# Patient Record
Sex: MALE | Race: WHITE | URBAN - METROPOLITAN AREA
[De-identification: names, ages, dates, MRNs, and addresses within clinical notes are randomized per-mention and may not be internally consistent; named-entity substitution may affect disease eponyms.]

---

## 2021-03-04 ENCOUNTER — TRANSFERRED RECORDS (OUTPATIENT)
Dept: HEALTH INFORMATION MANAGEMENT | Facility: CLINIC | Age: 51
End: 2021-03-04

## 2021-03-11 ENCOUNTER — NURSING HOME VISIT (OUTPATIENT)
Dept: GERIATRICS | Facility: CLINIC | Age: 51
End: 2021-03-11
Payer: COMMERCIAL

## 2021-03-11 VITALS
SYSTOLIC BLOOD PRESSURE: 138 MMHG | HEART RATE: 68 BPM | RESPIRATION RATE: 16 BRPM | DIASTOLIC BLOOD PRESSURE: 82 MMHG | TEMPERATURE: 98.6 F | OXYGEN SATURATION: 98 %

## 2021-03-11 DIAGNOSIS — I10 ESSENTIAL HYPERTENSION: ICD-10-CM

## 2021-03-11 DIAGNOSIS — I87.2 VENOUS STASIS DERMATITIS OF BOTH LOWER EXTREMITIES: ICD-10-CM

## 2021-03-11 DIAGNOSIS — I50.31 ACUTE DIASTOLIC HEART FAILURE (H): Primary | ICD-10-CM

## 2021-03-11 DIAGNOSIS — F10.10 ALCOHOL ABUSE: ICD-10-CM

## 2021-03-11 DIAGNOSIS — R53.81 PHYSICAL DECONDITIONING: ICD-10-CM

## 2021-03-11 PROBLEM — R60.0 BILATERAL LEG EDEMA: Status: ACTIVE | Noted: 2021-03-04

## 2021-03-11 PROBLEM — R80.9 PROTEINURIA: Status: ACTIVE | Noted: 2021-03-05

## 2021-03-11 PROBLEM — F32.A DEPRESSION: Status: ACTIVE | Noted: 2021-03-04

## 2021-03-11 PROCEDURE — 99309 SBSQ NF CARE MODERATE MDM 30: CPT | Performed by: NURSE PRACTITIONER

## 2021-03-11 RX ORDER — POLYETHYLENE GLYCOL 3350 17 G/17G
17 POWDER, FOR SOLUTION ORAL DAILY
COMMUNITY
Start: 2021-03-10

## 2021-03-11 RX ORDER — FUROSEMIDE 40 MG
40 TABLET ORAL DAILY
COMMUNITY
Start: 2021-03-10

## 2021-03-11 RX ORDER — POTASSIUM CHLORIDE 1500 MG/1
20 TABLET, EXTENDED RELEASE ORAL DAILY
COMMUNITY
Start: 2021-03-10

## 2021-03-11 RX ORDER — LISINOPRIL 20 MG/1
20 TABLET ORAL 2 TIMES DAILY
COMMUNITY
Start: 2021-03-10

## 2021-03-11 RX ORDER — LANOLIN ALCOHOL/MO/W.PET/CERES
6 CREAM (GRAM) TOPICAL
COMMUNITY
Start: 2021-03-10

## 2021-03-11 RX ORDER — CARVEDILOL 25 MG/1
25 TABLET ORAL 2 TIMES DAILY
COMMUNITY
Start: 2021-03-10

## 2021-03-11 RX ORDER — CEPHALEXIN 500 MG/1
1000 CAPSULE ORAL 3 TIMES DAILY
COMMUNITY
Start: 2021-03-10 | End: 2021-03-13

## 2021-03-11 RX ORDER — ACETAMINOPHEN 325 MG/1
650 TABLET ORAL 4 TIMES DAILY PRN
COMMUNITY
Start: 2021-03-10

## 2021-03-11 RX ORDER — FOLIC ACID 1 MG/1
1 TABLET ORAL DAILY
COMMUNITY
Start: 2021-03-11

## 2021-03-11 RX ORDER — OXYCODONE HYDROCHLORIDE 5 MG/1
5 TABLET ORAL EVERY 4 HOURS PRN
COMMUNITY
Start: 2021-03-10

## 2021-03-11 RX ORDER — MULTIVITAMIN
1 TABLET ORAL DAILY
COMMUNITY
Start: 2021-03-10

## 2021-03-11 NOTE — LETTER
3/11/2021        RE: Anjel Mullins  [address] [place] [id number]        [facility]  PRIMARY CARE PROVIDER AND CLINIC:  No primary care provider on file., No primary physician on file.  Chief Complaint   Patient presents with     [facility] F/U     [place] Medical Record Number:  [id number]  Place of Service where encounter took place:  [facility] (TCU) [[id number]]    Anjel Mullins  is a 50 year old  (1970), admitted to the above facility from  [facility] . Hospital stay 3/4/21 through 3/10/21..  Admitted to this facility for  rehab, medical management and nursing care.    HPI:    HPI information obtained from: facility chart records, facility staff, patient report and Care Everywhere Epic chart review.   Brief Summary of Hospital Course: PMH alcoholism and untreated HTN. He presented to the ED with progressive BLE edema and pain. He has had longstanding edema that worsened over a week and he noted CRUZ. He developed several small open areas on legs that were weeping. He also had severely high BP. Echo consistent with diastolic CHF. He was diuresed with lasix IV infusion. Carvedilol and lisinopril started for HTN. He did suffer from some mild alcohol withdrawal. He plans to go to chem dep treatment after his TCU stay.     Updates on Status Since Skilled nursing Admission:   Patient reports that he is already getting some strength back. In the hospital he couldn't even take two steps and today he was able to walk around his room with the walker. Legs are currently wrapped. No SOB, abdominal discomfort. -150s/80s    CODE STATUS/ADVANCE DIRECTIVES DISCUSSION:   CPR/Full code   Patient's living condition: lives with spouse  ALLERGIES: Patient has no known allergies.  PAST MEDICAL HISTORY:  has a past medical history of HTN.  PAST SURGICAL HISTORY:   has a past surgical history that includes surgical history of -   (2002).  FAMILY HISTORY: family history includes Diabetes in his mother.  SOCIAL HISTORY:   reports that he has never smoked. He does not have any smokeless tobacco history on file.    Post Discharge Medication Reconciliation Status: discharge medications reconciled, continue medications without change    Current Outpatient Medications   Medication Sig Dispense Refill     acetaminophen (TYLENOL) 325 MG tablet Take 650 mg by mouth 4 times daily as needed       carvedilol (COREG) 25 MG tablet Take 25 mg by mouth 2 times daily       cephALEXin (KEFLEX) 500 MG capsule Take 1,000 mg by mouth 3 times daily       folic acid (FOLVITE) 1 MG tablet Take 1 mg by mouth daily       furosemide (LASIX) 40 MG tablet Take 40 mg by mouth daily       lisinopril (ZESTRIL) 20 MG tablet Take 20 mg by mouth 2 times daily       melatonin 3 MG tablet Take 6 mg by mouth nightly as needed       oxyCODONE (ROXICODONE) 5 MG tablet Take 5 mg by mouth every 4 hours as needed       polyethylene glycol (MIRALAX) 17 GM/Dose powder 17 g by Nasogastric route daily       potassium chloride ER (KLOR-CON M) 20 MEQ CR tablet Take 20 mEq by mouth daily       Specialty Vitamins Products (VITAMINS FOR HAIR) TABS Take 1 tablet by mouth daily       thiamine 50 MG TABS Take 50 mg by mouth daily         ROS:  10 point ROS of systems including Constitutional, Eyes, Respiratory, Cardiovascular, Gastroenterology, Genitourinary, Integumentary, Musculoskeletal, Psychiatric were all negative except for pertinent positives noted in my HPI.    Vitals:  /82   Pulse 68   Temp 98.6  F (37  C)   Resp 16   SpO2 98%   Exam:  GENERAL APPEARANCE:  Alert, in no distress, oriented  ENT:  Mouth and posterior oropharynx normal, moist mucous membranes, normal hearing acuity  EYES:  EOM, conjunctivae, lids, pupils and irises normal  RESP:  respiratory effort and palpation of chest normal, lungs clear to auscultation , no respiratory distress  CV:  Palpation and  auscultation of heart done , regular rate and rhythm, no murmur, rub, or gallop, peripheral edema 3+ in BLE  ABDOMEN:  normal bowel sounds, soft, nontender, no hepatosplenomegaly or other masses  SKIN:  Inspection of skin and subcutaneous tissue baseline, Palpation of skin and subcutaneous tissue baseline, (did not view legs due to lymphedema wraps)  PSYCH:  oriented X 3, affect and mood normal    Lab/Diagnostic data:  Recent labs in [facility] reviewed by me today.       ASSESSMENT/PLAN:  (I50.31) Acute diastolic heart failure (H)  (primary encounter diagnosis)  Comment: Ongoing edema, but no evidence of pulmonary or abdominal fluid.   Plan: Continue current medications: lasix. BMP 3/15. Monitor weights q day. Call provider if greater than 5 pound gain from previous weight. Monitor for shortness of breath, wheezing, increasing lower extremity edema and change in activity tolerance.     (I10) Essential hypertension  Comment: Fair control. BB and ACEI are already at max doses. If BP were to consistently be >140/90, would need to add another agent, likely CCB. Monitor for now  Plan: Continue current POC with no changes at this time and adjustments as needed.    (R53.81) Physical deconditioning  Comment: Due to acute illness  Plan: PT/OT eval and treat, discharge planning per their recommendations.    (I87.2) Venous stasis dermatitis of both lower extremities  Comment: Chronic, but will likely improve with lymphedema therapy, good nursing care  Plan: Continue current POC with no changes at this time and adjustments as needed.    (F10.10) Alcohol abuse  Comment: Chronic  Plan: Social work to assist as needed with treatment plan      Electronically signed by:  PRATIBHA Echols [facility]  Phone: [phone]

## 2021-03-11 NOTE — PROGRESS NOTES
[facility]  PRIMARY CARE PROVIDER AND CLINIC:  No primary care provider on file., No primary physician on file.  Chief Complaint   Patient presents with     [facility] F/U     [place] Medical Record Number:  [id number]  Place of Service where encounter took place:  [facility] (U) [[id number]]    Anjel Mullins  is a 50 year old  (1970), admitted to the above facility from  [facility] . Hospital stay 3/4/21 through 3/10/21..  Admitted to this facility for  rehab, medical management and nursing care.    HPI:    HPI information obtained from: facility chart records, facility staff, patient report and Care Everywhere Epic chart review.   Brief Summary of Hospital Course: PMH alcoholism and untreated HTN. He presented to the ED with progressive BLE edema and pain. He has had longstanding edema that worsened over a week and he noted CRUZ. He developed several small open areas on legs that were weeping. He also had severely high BP. Echo consistent with diastolic CHF. He was diuresed with lasix IV infusion. Carvedilol and lisinopril started for HTN. He did suffer from some mild alcohol withdrawal. He plans to go to chem dep treatment after his TCU stay.     Updates on Status Since Skilled nursing Admission:   Patient reports that he is already getting some strength back. In the hospital he couldn't even take two steps and today he was able to walk around his room with the walker. Legs are currently wrapped. No SOB, abdominal discomfort. -150s/80s    CODE STATUS/ADVANCE DIRECTIVES DISCUSSION:   CPR/Full code   Patient's living condition: lives with spouse  ALLERGIES: Patient has no known allergies.  PAST MEDICAL HISTORY:  has a past medical history of HTN.  PAST SURGICAL HISTORY:   has a past surgical history that includes surgical history of -  (2002).  FAMILY HISTORY: family history includes Diabetes in his mother.  SOCIAL HISTORY:   reports that  he has never smoked. He does not have any smokeless tobacco history on file.    Post Discharge Medication Reconciliation Status: discharge medications reconciled, continue medications without change    Current Outpatient Medications   Medication Sig Dispense Refill     acetaminophen (TYLENOL) 325 MG tablet Take 650 mg by mouth 4 times daily as needed       carvedilol (COREG) 25 MG tablet Take 25 mg by mouth 2 times daily       cephALEXin (KEFLEX) 500 MG capsule Take 1,000 mg by mouth 3 times daily       folic acid (FOLVITE) 1 MG tablet Take 1 mg by mouth daily       furosemide (LASIX) 40 MG tablet Take 40 mg by mouth daily       lisinopril (ZESTRIL) 20 MG tablet Take 20 mg by mouth 2 times daily       melatonin 3 MG tablet Take 6 mg by mouth nightly as needed       oxyCODONE (ROXICODONE) 5 MG tablet Take 5 mg by mouth every 4 hours as needed       polyethylene glycol (MIRALAX) 17 GM/Dose powder 17 g by Nasogastric route daily       potassium chloride ER (KLOR-CON M) 20 MEQ CR tablet Take 20 mEq by mouth daily       Specialty Vitamins Products (VITAMINS FOR HAIR) TABS Take 1 tablet by mouth daily       thiamine 50 MG TABS Take 50 mg by mouth daily         ROS:  10 point ROS of systems including Constitutional, Eyes, Respiratory, Cardiovascular, Gastroenterology, Genitourinary, Integumentary, Musculoskeletal, Psychiatric were all negative except for pertinent positives noted in my HPI.    Vitals:  /82   Pulse 68   Temp 98.6  F (37  C)   Resp 16   SpO2 98%   Exam:  GENERAL APPEARANCE:  Alert, in no distress, oriented  ENT:  Mouth and posterior oropharynx normal, moist mucous membranes, normal hearing acuity  EYES:  EOM, conjunctivae, lids, pupils and irises normal  RESP:  respiratory effort and palpation of chest normal, lungs clear to auscultation , no respiratory distress  CV:  Palpation and auscultation of heart done , regular rate and rhythm, no murmur, rub, or gallop, peripheral edema 3+ in BLE  ABDOMEN:   normal bowel sounds, soft, nontender, no hepatosplenomegaly or other masses  SKIN:  Inspection of skin and subcutaneous tissue baseline, Palpation of skin and subcutaneous tissue baseline, (did not view legs due to lymphedema wraps)  PSYCH:  oriented X 3, affect and mood normal    Lab/Diagnostic data:  Recent labs in [facility] reviewed by me today.       ASSESSMENT/PLAN:  (I50.31) Acute diastolic heart failure (H)  (primary encounter diagnosis)  Comment: Ongoing edema, but no evidence of pulmonary or abdominal fluid.   Plan: Continue current medications: lasix. BMP 3/15. Monitor weights q day. Call provider if greater than 5 pound gain from previous weight. Monitor for shortness of breath, wheezing, increasing lower extremity edema and change in activity tolerance.     (I10) Essential hypertension  Comment: Fair control. BB and ACEI are already at max doses. If BP were to consistently be >140/90, would need to add another agent, likely CCB. Monitor for now  Plan: Continue current POC with no changes at this time and adjustments as needed.    (R53.81) Physical deconditioning  Comment: Due to acute illness  Plan: PT/OT eval and treat, discharge planning per their recommendations.    (I87.2) Venous stasis dermatitis of both lower extremities  Comment: Chronic, but will likely improve with lymphedema therapy, good nursing care  Plan: Continue current POC with no changes at this time and adjustments as needed.    (F10.10) Alcohol abuse  Comment: Chronic  Plan: Social work to assist as needed with treatment plan      Electronically signed by:  PRATIBHA Echols CNP   [facility]  Phone: [phone]

## 2021-03-15 ENCOUNTER — TELEPHONE (OUTPATIENT)
Dept: GERIATRICS | Facility: CLINIC | Age: 51
End: 2021-03-15

## 2021-03-15 NOTE — TELEPHONE ENCOUNTER
[facility] Nurse Triage Telephone Note    Provider: Margarita Bobo NP  Facility: [facility]     Facility Type:  TCU    Caller: Leila  Call Back Number: [phone]    Allergies:  No Known Allergies     Reason for call: Elevated BP's especially am's.  This am before meds 180/110. After Carvedilol 25mg, Lisinipril 20mg,  Lasix 40mg BP still 177/105 P:74. Has lymphedema wraps on & wt down supposedly to 233#. (Has been running 242-243#). Pt denies any complaints, lungs clear. Of note meds are given 8am & 1700.    Verbal Order/Direction given by Provider: Change the BID Carvedilol & Lisinipril to be given 8am & 8PM to get better around the clock BP coverage.  If SBP tomorrow after am meds is still >170 call again to discuss maybe adding CCB.    Provider giving Order:  Sierra Wilkerson NP    Verbal Order given to: Luis Rogers RN

## 2021-03-15 NOTE — PROGRESS NOTES
Comment: Triage Registered Nurse called to update staff concerns regarding elevated BP. This AM prior to medications his SBP>180. Re-assessment of BP remains >170. No additional clinical s/sx of CHF noted. Lungs clear. All other vitals stable. No complaints verbalized to staff of other concerns. Current medications are given at 8am and 5pm.   Plan:  -Instructed staff to change time administration of coreg and lisinopril to 8am and 8pm.  -If SBP>170 tomorrow AM 3/16/21-would recommend starting amlodipine 5mg daily.    -Continue to monitor BP and HR as directed at facility

## 2021-03-16 ENCOUNTER — TELEPHONE (OUTPATIENT)
Dept: GERIATRICS | Facility: CLINIC | Age: 51
End: 2021-03-16

## 2021-03-16 NOTE — TELEPHONE ENCOUNTER
Resident having higher blood pressures during the day and now around 12 midnight his pressure is 193/123    Received his B/P medications already.    Orders:  From the E kit get Hydralazine 10mg po now.  Call back in one hour if blood pressure above the reported b/p above.  If lower than let him rest    Electronically signed by Tamara Torres RN, CNP

## 2021-03-17 NOTE — TELEPHONE ENCOUNTER
1750:  Patients blood pressure 196/120 - did receive one time dose of hydralazine overnight for elevated blood pressure as well.      ORDERS   -Give hydralazine 10 mg from the kit now  -recheck blood pressure in one hour, contact provider of systolic blood pressure greater than 170  -once patient is stable, start hydralazine 25 mg PO be ID PRN for systolic blood pressure greater than 170; notify primary nurse practitioner if this is used  ----------------  1900:  Patient's BP now 210/118 following Hydralazine 10mg dose     ORDERS  -Give Hydralazine 20mg NOW  -recheck blood pressure in one hour, contact provider of systolic blood pressure greater than 170  --------------------------  2115:  Patient's BP 45min ago 219/120 manually; done again 191/112 on the machine - he had just received his Carvedilol and Lisinopril. Nurse rechecked BP manually while writer on the phone attaining a BP of 196/124.    ORDERS  -Clonidine 0.1mg NOW  -Hydralazine 50mg NOW  -recheck blood pressure in one hour, contact provider of systolic blood pressure greater than 170    ----------------------------  2300:  Patient's /118 manually    ORDERS  -Clonidine 0.1mg NOW  -recheck blood pressure in one hour, contact provider of systolic blood pressure greater than 170    -------------------------------  0430:  No return call - if Clonidine proved more effective than Hydralazine recommend scheduling this 2/2 excessive resistance to gain control       Dr. Jessica Monge, APRN, BETTIE  [place] [facility]  [place] Office: [address] [place] [address]   [place] Cell: [phone]  [place] Fax: [phone]     [facility] Offce: [address] [place] [address]  [facility] Phone: [phone]  [employer] Voicemail: [phone]     Email: [email or web address]

## 2021-03-18 ENCOUNTER — NURSING HOME VISIT (OUTPATIENT)
Dept: GERIATRICS | Facility: CLINIC | Age: 51
End: 2021-03-18
Payer: COMMERCIAL

## 2021-03-18 VITALS
BODY MASS INDEX: 29.42 KG/M2 | HEART RATE: 77 BPM | HEIGHT: 73 IN | OXYGEN SATURATION: 98 % | DIASTOLIC BLOOD PRESSURE: 86 MMHG | RESPIRATION RATE: 20 BRPM | TEMPERATURE: 98.5 F | WEIGHT: 222 LBS | SYSTOLIC BLOOD PRESSURE: 155 MMHG

## 2021-03-18 DIAGNOSIS — I87.2 VENOUS STASIS DERMATITIS OF BOTH LOWER EXTREMITIES: ICD-10-CM

## 2021-03-18 DIAGNOSIS — R53.81 PHYSICAL DECONDITIONING: ICD-10-CM

## 2021-03-18 DIAGNOSIS — I50.31 ACUTE DIASTOLIC HEART FAILURE (H): Primary | ICD-10-CM

## 2021-03-18 DIAGNOSIS — F10.10 ALCOHOL ABUSE: ICD-10-CM

## 2021-03-18 DIAGNOSIS — I10 ESSENTIAL HYPERTENSION: ICD-10-CM

## 2021-03-18 PROCEDURE — 99309 SBSQ NF CARE MODERATE MDM 30: CPT | Performed by: NURSE PRACTITIONER

## 2021-03-18 RX ORDER — HYDRALAZINE HYDROCHLORIDE 25 MG/1
25 TABLET, FILM COATED ORAL 2 TIMES DAILY PRN
Start: 2021-03-18

## 2021-03-18 RX ORDER — AMLODIPINE BESYLATE 10 MG/1
10 TABLET ORAL DAILY
Start: 2021-03-18

## 2021-03-18 ASSESSMENT — MIFFLIN-ST. JEOR: SCORE: 1920.87

## 2021-03-18 NOTE — LETTER
3/18/2021        RE: Anjel Mullins  [address] [place] [id number]        [facility]  [place] Medical Record Number:  [id number]  Place of Service where encounter took place:  [facility] [[id number]]  Chief Complaint   Patient presents with     RECHECK       HPI:    Anjel Mullins  is a 50 year old (1970), who is being seen today for an episodic care visit. [facility] stay 3/4/21 through 3/10/21. Admitted to this facility for  rehab, medical management and nursing care. PMH alcoholism and untreated HTN. He presented to the ED with progressive BLE edema and pain. He has had longstanding edema that worsened over a week and he noted CRUZ. He developed several small open areas on legs that were weeping. He also had severely high BP. Echo consistent with diastolic CHF. He was diuresed with lasix IV infusion. Carvedilol and lisinopril started for HTN. He did suffer from some mild alcohol withdrawal. He plans to go to [facility] treatment after his TCU stay.       HPI information obtained from: facility chart records, facility staff and patient report.     Today's concern is:  Acute diastolic heart failure (H)  Weight and edema are reduced significantly. Lymphedema therapy has been wrapping his legs. No SOB, hypoxia  Wt Readings from Last 4 Encounters:   03/18/21 100.7 kg (222 lb)   02/16/09 112.9 kg (249 lb)     Essential hypertension  -200s/90-120s. He denies having any anxiety or pain when his BP is more elevated.    Physical deconditioning  Working with therapy, improving    Venous stasis dermatitis of both lower extremities  Wound doctor saw him yesterday and debrided several areas of thick skin. Nursing reports that the tissue below these was healthy looking        Past Medical and Surgical History reviewed in Epic today.    MEDICATIONS:    Current Outpatient Medications   Medication Sig Dispense Refill     acetaminophen  "(TYLENOL) 325 MG tablet Take 650 mg by mouth 4 times daily as needed       carvedilol (COREG) 25 MG tablet Take 25 mg by mouth 2 times daily       folic acid (FOLVITE) 1 MG tablet Take 1 mg by mouth daily       furosemide (LASIX) 40 MG tablet Take 40 mg by mouth daily       hydrALAZINE (APRESOLINE) 25 MG tablet Take 1 tablet (25 mg) by mouth 2 times daily as needed (SBP > 170)       lisinopril (ZESTRIL) 20 MG tablet Take 20 mg by mouth 2 times daily       melatonin 3 MG tablet Take 6 mg by mouth nightly as needed       oxyCODONE (ROXICODONE) 5 MG tablet Take 5 mg by mouth every 4 hours as needed       polyethylene glycol (MIRALAX) 17 GM/Dose powder 17 g by Nasogastric route daily       potassium chloride ER (KLOR-CON M) 20 MEQ CR tablet Take 20 mEq by mouth daily       Specialty Vitamins Products (VITAMINS FOR HAIR) TABS Take 1 tablet by mouth daily       thiamine 50 MG TABS Take 50 mg by mouth daily           REVIEW OF SYSTEMS:  10 point ROS of systems including Constitutional, Eyes, Respiratory, Cardiovascular, Gastroenterology, Genitourinary, Integumentary, Musculoskeletal, Psychiatric were all negative except for pertinent positives noted in my HPI.    Objective:  BP (!) 155/86   Pulse 77   Temp 98.5  F (36.9  C)   Resp 20   Ht 1.854 m (6' 1\")   Wt 100.7 kg (222 lb)   SpO2 98%   BMI 29.29 kg/m    Exam:  GENERAL APPEARANCE:  Alert, in no distress, oriented  ENT:  Mouth and posterior oropharynx normal, moist mucous membranes, normal hearing acuity  EYES:  EOM, conjunctivae, lids, pupils and irises normal  RESP:  respiratory effort and palpation of chest normal, lungs clear to auscultation , no respiratory distress  CV:  Palpation and auscultation of heart done , regular rate and rhythm, no murmur, rub, or gallop, legs wrapped, pedal edema appears to be 3+, but lower legs now 1-2+  ABDOMEN:  normal bowel sounds, soft, nontender, no hepatosplenomegaly or other masses  SKIN:  Inspection of skin and " subcutaneous tissue baseline, Palpation of skin and subcutaneous tissue baseline, (did not view legs due to lymphedema wraps)  PSYCH:  oriented X 3, affect and mood normal      Labs:   Recent labs in [facility] reviewed by me today.       ASSESSMENT/PLAN:  (I50.31) Acute diastolic heart failure (H)  (primary encounter diagnosis)  Comment: Improving with diuretic and compression.   Plan: Continue current POC with no changes at this time and adjustments as needed.    (I10) Essential hypertension  Comment: Poorly controlled. BB and ACEI are already at max doses. Will add CCB  Plan: Amlodipine 10mg daily. Monitor BP. Adjust medication as clinically indicated.    (R53.81) Physical deconditioning  Comment: Improving  Plan: PT/OT eval and treat, discharge planning per their recommendations.    (I87.2) Venous stasis dermatitis of both lower extremities  Comment: Improving  Plan: Continue current POC with no changes at this time and adjustments as needed.    (F10.10) Alcohol abuse  Comment: After visit, social reported that patient has expressed significant depression. At next visit, will discuss with patient and offer to start medication for this. There are no plans for him to go to inpatient treatment, this would be private pay.  is not aware of any outpatient plans    Orders written by provider at facility  Amlodipine 10mg daily      Electronically signed by:  PRATIBHA Ecohls [facility]  Phone: [phone]

## 2021-03-18 NOTE — PROGRESS NOTES
[facility]  [place] Medical Record Number:  [id number]  Place of Service where encounter took place:  [facility] ([facility]) [[id number]]  Chief Complaint   Patient presents with     RECHECK       HPI:    Anjel Mullins  is a 50 year old (1970), who is being seen today for an episodic care visit. [facility] stay 3/4/21 through 3/10/21. Admitted to this facility for  rehab, medical management and nursing care. PMH alcoholism and untreated HTN. He presented to the ED with progressive BLE edema and pain. He has had longstanding edema that worsened over a week and he noted RCUZ. He developed several small open areas on legs that were weeping. He also had severely high BP. Echo consistent with diastolic CHF. He was diuresed with lasix IV infusion. Carvedilol and lisinopril started for HTN. He did suffer from some mild alcohol withdrawal. He plans to go to [facility] dep treatment after his TCU stay.       HPI information obtained from: facility chart records, facility staff and patient report.     Today's concern is:  Acute diastolic heart failure (H)  Weight and edema are reduced significantly. Lymphedema therapy has been wrapping his legs. No SOB, hypoxia  Wt Readings from Last 4 Encounters:   03/18/21 100.7 kg (222 lb)   02/16/09 112.9 kg (249 lb)     Essential hypertension  -200s/90-120s. He denies having any anxiety or pain when his BP is more elevated.    Physical deconditioning  Working with therapy, improving    Venous stasis dermatitis of both lower extremities  Wound doctor saw him yesterday and debrided several areas of thick skin. Nursing reports that the tissue below these was healthy looking        Past Medical and Surgical History reviewed in Epic today.    MEDICATIONS:    Current Outpatient Medications   Medication Sig Dispense Refill     acetaminophen (TYLENOL) 325 MG tablet Take 650 mg by mouth 4 times daily as needed       carvedilol (COREG) 25 MG tablet  "Take 25 mg by mouth 2 times daily       folic acid (FOLVITE) 1 MG tablet Take 1 mg by mouth daily       furosemide (LASIX) 40 MG tablet Take 40 mg by mouth daily       hydrALAZINE (APRESOLINE) 25 MG tablet Take 1 tablet (25 mg) by mouth 2 times daily as needed (SBP > 170)       lisinopril (ZESTRIL) 20 MG tablet Take 20 mg by mouth 2 times daily       melatonin 3 MG tablet Take 6 mg by mouth nightly as needed       oxyCODONE (ROXICODONE) 5 MG tablet Take 5 mg by mouth every 4 hours as needed       polyethylene glycol (MIRALAX) 17 GM/Dose powder 17 g by Nasogastric route daily       potassium chloride ER (KLOR-CON M) 20 MEQ CR tablet Take 20 mEq by mouth daily       Specialty Vitamins Products (VITAMINS FOR HAIR) TABS Take 1 tablet by mouth daily       thiamine 50 MG TABS Take 50 mg by mouth daily           REVIEW OF SYSTEMS:  10 point ROS of systems including Constitutional, Eyes, Respiratory, Cardiovascular, Gastroenterology, Genitourinary, Integumentary, Musculoskeletal, Psychiatric were all negative except for pertinent positives noted in my HPI.    Objective:  BP (!) 155/86   Pulse 77   Temp 98.5  F (36.9  C)   Resp 20   Ht 1.854 m (6' 1\")   Wt 100.7 kg (222 lb)   SpO2 98%   BMI 29.29 kg/m    Exam:  GENERAL APPEARANCE:  Alert, in no distress, oriented  ENT:  Mouth and posterior oropharynx normal, moist mucous membranes, normal hearing acuity  EYES:  EOM, conjunctivae, lids, pupils and irises normal  RESP:  respiratory effort and palpation of chest normal, lungs clear to auscultation , no respiratory distress  CV:  Palpation and auscultation of heart done , regular rate and rhythm, no murmur, rub, or gallop, legs wrapped, pedal edema appears to be 3+, but lower legs now 1-2+  ABDOMEN:  normal bowel sounds, soft, nontender, no hepatosplenomegaly or other masses  SKIN:  Inspection of skin and subcutaneous tissue baseline, Palpation of skin and subcutaneous tissue baseline, (did not view legs due to lymphedema " wraps)  PSYCH:  oriented X 3, affect and mood normal      Labs:   Recent labs in EPIC reviewed by me today.       ASSESSMENT/PLAN:  (I50.31) Acute diastolic heart failure (H)  (primary encounter diagnosis)  Comment: Improving with diuretic and compression.   Plan: Continue current POC with no changes at this time and adjustments as needed.    (I10) Essential hypertension  Comment: Poorly controlled. BB and ACEI are already at max doses. Will add CCB  Plan: Amlodipine 10mg daily. Monitor BP. Adjust medication as clinically indicated.    (R53.81) Physical deconditioning  Comment: Improving  Plan: PT/OT eval and treat, discharge planning per their recommendations.    (I87.2) Venous stasis dermatitis of both lower extremities  Comment: Improving  Plan: Continue current POC with no changes at this time and adjustments as needed.    (F10.10) Alcohol abuse  Comment: After visit, social reported that patient has expressed significant depression. At next visit, will discuss with patient and offer to start medication for this. There are no plans for him to go to inpatient treatment, this would be private pay.  is not aware of any outpatient plans    Orders written by provider at facility  Amlodipine 10mg daily      Electronically signed by:  PRATIBHA Echols [facility]  Phone: [phone]

## 2021-03-23 ENCOUNTER — DISCHARGE SUMMARY NURSING HOME (OUTPATIENT)
Dept: GERIATRICS | Facility: CLINIC | Age: 51
End: 2021-03-23
Payer: COMMERCIAL

## 2021-03-23 VITALS
WEIGHT: 216 LBS | HEIGHT: 73 IN | BODY MASS INDEX: 28.63 KG/M2 | OXYGEN SATURATION: 97 % | HEART RATE: 65 BPM | DIASTOLIC BLOOD PRESSURE: 90 MMHG | TEMPERATURE: 97.8 F | SYSTOLIC BLOOD PRESSURE: 155 MMHG | RESPIRATION RATE: 18 BRPM

## 2021-03-23 DIAGNOSIS — F10.10 ALCOHOL ABUSE: ICD-10-CM

## 2021-03-23 DIAGNOSIS — I87.2 VENOUS STASIS DERMATITIS OF BOTH LOWER EXTREMITIES: ICD-10-CM

## 2021-03-23 DIAGNOSIS — R53.81 PHYSICAL DECONDITIONING: ICD-10-CM

## 2021-03-23 DIAGNOSIS — I10 ESSENTIAL HYPERTENSION: ICD-10-CM

## 2021-03-23 DIAGNOSIS — I50.31 ACUTE DIASTOLIC HEART FAILURE (H): Primary | ICD-10-CM

## 2021-03-23 PROCEDURE — 99316 NF DSCHRG MGMT 30 MIN+: CPT | Performed by: NURSE PRACTITIONER

## 2021-03-23 RX ORDER — MULTIVIT-MIN/IRON/FOLIC ACID/K 18-600-40
1 CAPSULE ORAL 2 TIMES DAILY
COMMUNITY

## 2021-03-23 RX ORDER — ZINC SULFATE 50(220)MG
220 CAPSULE ORAL DAILY
COMMUNITY
Start: 2021-03-20 | End: 2021-04-02

## 2021-03-23 RX ORDER — LIDOCAINE HYDROCHLORIDE 20 MG/ML
JELLY TOPICAL PRN
COMMUNITY

## 2021-03-23 ASSESSMENT — MIFFLIN-ST. JEOR: SCORE: 1893.65

## 2021-03-23 NOTE — LETTER
3/23/2021        RE: Anjel Mullins  [address] [place] [id number]        [facility] DISCHARGE SUMMARY  PATIENT'S NAME: Anjel Mullins  YOB: 1970  MEDICAL RECORD NUMBER:  [id number]  Place of Service where encounter took place:  [facility] ([facility]) [[id number]]    PRIMARY CARE PROVIDER AND CLINIC RESPONSIBLE AFTER TRANSFER:   Patient has not had a PCP, he will be making an appointment at the [facility] where his wife goes    Transferring providers: PRATIBHA Echols CNP, Ed Adams MD  Recent Hospitalization/ED:  Hospital  [facility]  stay 3/4/21 to 3/10/21.  Date of SNF Admission: March/10/2021  Date of SNF (anticipated) Discharge: March/25/2021  Discharged to: previous independent home  Physical Function: Independent with walker, therapy has been working on using no device      CODE STATUS/ADVANCE DIRECTIVES DISCUSSION:  Full Code   ALLERGIES: Patient has no known allergies.    DISCHARGE DIAGNOSIS/NURSING FACILITY COURSE:   [facility] stay 3/4/21 through 3/10/21. Admitted to this facility for  rehab, medical management and nursing care. PMH alcoholism and untreated HTN. He presented to the ED with progressive BLE edema and pain. He has had longstanding edema that worsened over a week and he noted CRUZ. He developed several small open areas on legs that were weeping. He also had severely high BP. Echo consistent with diastolic CHF. He was diuresed with lasix IV infusion. Carvedilol and lisinopril started for HTN. He did suffer from some mild alcohol withdrawal.     Acute diastolic heart failure (H)  Weight and edema have decreased significantly. Home therapy will be assisting with lymphedema wraps. These currently have been changed M,W,F. He will discharge on the current dose of lasix.   Wt Readings from Last 4 Encounters:   03/23/21 98 kg (216 lb)   03/18/21 100.7 kg (222 lb)   02/16/09 112.9 kg  (249 lb)     Venous stasis dermatitis of both lower extremities  In-house wound doctor saw patient on 3/17. She debrided several areas of eschar and thick skin on both legs. After this there were open wounds. According to staff, his left leg is healing well, looks healthy. He still has a large open area on his right foot. He says this makes it very painful to plantar flex his foot. He is currently getting dressing changes M,W,F. The plan is for home care to work with him and his wife on managing this themselves. He has a cardiology appointment on Friday, so if home care cannot see him until Saturday, the dressing change could be postponed a day. The wound doctor had added lidocaine gel for dressing changes, he says this helps quite a bit. She also added Vit C and zinc.     Essential hypertension  Amlodipine was added for -210s/110-120s. For the past few days, BP has been 140-160s/60-80s. Will leave prn hydralazine in place for now. He sees cardiology this Friday. He does have a BP cuff at home and is advised to measure and record this at least daily    Physical deconditioning  Improving. He is safe to discharge and will continue work with therapy at home    Alcohol abuse  No plans for treatment at this time. He is not yet medically stable for inpatient treatment, but would recommend some type of outpatient support when able. Home health SW can assist with this as needed      Past Medical History:  has a past medical history of HTN.    Discharge Medications:    Current Outpatient Medications   Medication Sig Dispense Refill     acetaminophen (TYLENOL) 325 MG tablet Take 650 mg by mouth 4 times daily as needed       amLODIPine (NORVASC) 10 MG tablet Take 1 tablet (10 mg) by mouth daily       Ascorbic Acid (VITAMIN C) 500 MG CAPS Take 1 capsule by mouth 2 times daily       carvedilol (COREG) 25 MG tablet Take 25 mg by mouth 2 times daily       folic acid (FOLVITE) 1 MG tablet Take 1 mg by mouth daily        "furosemide (LASIX) 40 MG tablet Take 40 mg by mouth daily       hydrALAZINE (APRESOLINE) 25 MG tablet Take 1 tablet (25 mg) by mouth 2 times daily as needed (SBP > 170)       lidocaine (XYLOCAINE) 2 % external gel Apply topically as needed for moderate pain Apply to BLE and allow to sit x 10 minutes prior to wound care on M,W,F       lisinopril (ZESTRIL) 20 MG tablet Take 20 mg by mouth 2 times daily       melatonin 3 MG tablet Take 6 mg by mouth nightly as needed       oxyCODONE (ROXICODONE) 5 MG tablet Take 5 mg by mouth every 4 hours as needed       polyethylene glycol (MIRALAX) 17 GM/Dose powder 17 g by Nasogastric route daily       potassium chloride ER (KLOR-CON M) 20 MEQ CR tablet Take 20 mEq by mouth daily       Specialty Vitamins Products (VITAMINS FOR HAIR) TABS Take 1 tablet by mouth daily       thiamine 50 MG TABS Take 50 mg by mouth daily       zinc sulfate (ZINCATE) 220 (50 Zn) MG capsule Take 220 mg by mouth daily         Medication Changes/Rationale:     Amlodipine and hydralazine added for hypertension    Vitamin C and zinc added for wound healing    Controlled medications sent with patient:   remaining oxycodone to be sent     ROS:   10 point ROS of systems including Constitutional, Eyes, Respiratory, Cardiovascular, Gastroenterology, Genitourinary, Integumentary, Musculoskeletal, Psychiatric were all negative except for pertinent positives noted in my HPI.    Physical Exam:   Vitals: BP (!) 155/90   Pulse 65   Temp 97.8  F (36.6  C)   Resp 18   Ht 1.854 m (6' 1\")   Wt 98 kg (216 lb)   SpO2 97%   BMI 28.50 kg/m    BMI= Body mass index is 28.5 kg/m .   GENERAL APPEARANCE:  Alert, in no distress, oriented  ENT:  Mouth and posterior oropharynx normal, moist mucous membranes, normal hearing acuity  EYES:  EOM, conjunctivae, lids, pupils and irises normal  RESP:  respiratory effort and palpation of chest normal, lungs clear to auscultation , no respiratory distress  CV:  Palpation and " auscultation of heart done , regular rate and rhythm, no murmur, rub, or gallop, legs wrapped, pedal edema appears to be 3+, but lower legs now 1-2+  ABDOMEN:  normal bowel sounds, soft, nontender, no hepatosplenomegaly or other masses  SKIN: did not view legs due to lymphedema wraps/dressing changes M,W,F  PSYCH:  oriented X 3, affect and mood normal      SNF labs:   3/15/21  Na 139  K 4.0  BUN 12  Creat 0.9      DISCHARGE PLAN:    Follow up labs: No labs orders/due    Medical Follow Up:      Follow up with primary care provider in 1-2 weeks   Follow up with cardiology 3/26/21    Discharge Services: Home Care:  Occupational Therapy, Physical Therapy, Registered Nurse,  and From:  [facility]    Discharge Instructions Verbalized to Patient at Discharge:     Wound care M,W,F    Bilateral lower extremities:   1. Remove compression wraps and dressings   2. Apply lidocaine gel and allow to sit x 10 minutes   3. Gently wash with soap and water using wash cloth   4. Apply Xerofoam to open areas   5. Apply aquaphor to intact skin   6. Cover with non-adherent gauze   7. Therapy to replace lymphedema wraps         TOTAL DISCHARGE TIME:   Greater than 30 minutes  Electronically signed by:  PRATIBHA Echols [facility]  Phone: [phone]          Documentation of Face-to-Face and Certification for Home Health Services     Patient: Anjel Mullins   YOB: 1970  MR Number: [id number]  Today's Date: 3/23/2021    I certify that patient: Anjel Mullins is under my care and that I, or a nurse practitioner or physician's assistant working with me, had a face-to-face encounter that meets the physician face-to-face encounter requirements with this patient on: 3/23/2021.    This encounter with the patient was in whole, or in part, for the following medical condition, which is the primary reason for home health care: The primary encounter diagnosis was Acute  diastolic heart failure (H). Diagnoses of Venous stasis dermatitis of both lower extremities, Essential hypertension, Physical deconditioning, and Alcohol abuse were also pertinent to this visit..    I certify that, based on my findings, the following services are medically necessary home health services: Nursing, Occupational Therapy, Physical Therapy and Social Work.    My clinical findings support the need for the above services because: Nurse is needed: To assess HTN after changes in medications or other medical regimen. and To provide caregiver training to assist with: wound care.., Occupational Therapy Services are needed to assess and treat cognitive ability and address ADL safety due to impairment in activity tolerance. and Physical Therapy Services are needed to assess and treat the following functional impairments: deconditioning.    Further, I certify that my clinical findings support that this patient is homebound (i.e. absences from home require considerable and taxing effort and are for medical reasons or [facility] or infrequently or of short duration when for other reasons) because: Requires assistance of another person or specialized equipment to access medical services because patient: Is unable to walk greater than 100 feet without rest...    Based on the above findings. I certify that this patient is confined to the home and needs intermittent skilled nursing care, physical therapy and/or speech therapy.  The patient is under my care, and I have initiated the establishment of the plan of care.  This patient will be followed by a physician who will periodically review the plan of care.  Physician/Provider to provide follow up care: No Ref-Primary, Physician    Responsible Medicare certified PEC[employer] Physician: Dr.Dan Bryan MD, signing F2F and only signing for initial order. Please send all follow up questions and concerns or needed follow up signatures to the PCP    Date:  3/23/2021

## 2021-03-23 NOTE — PROGRESS NOTES
[facility] DISCHARGE SUMMARY  PATIENT'S NAME: Anjel Mullins  YOB: 1970  MEDICAL RECORD NUMBER:  [id number]  Place of Service where encounter took place:  [facility] [[id number]]    PRIMARY CARE PROVIDER AND CLINIC RESPONSIBLE AFTER TRANSFER:   Patient has not had a PCP, he will be making an appointment at the [facility] where his wife goes    Transferring providers: PRATIBHA Echols CNP, Ed Adams MD  Recent Hospitalization/ED:  Hospital  [facility]  stay 3/4/21 to 3/10/21.  Date of SNF Admission: March/10/2021  Date of SNF (anticipated) Discharge: March/25/2021  Discharged to: previous independent home  Physical Function: Independent with walker, therapy has been working on using no device      CODE STATUS/ADVANCE DIRECTIVES DISCUSSION:  Full Code   ALLERGIES: Patient has no known allergies.    DISCHARGE DIAGNOSIS/NURSING FACILITY COURSE:   [facility] stay 3/4/21 through 3/10/21. Admitted to this facility for  rehab, medical management and nursing care. PMH alcoholism and untreated HTN. He presented to the ED with progressive BLE edema and pain. He has had longstanding edema that worsened over a week and he noted CRUZ. He developed several small open areas on legs that were weeping. He also had severely high BP. Echo consistent with diastolic CHF. He was diuresed with lasix IV infusion. Carvedilol and lisinopril started for HTN. He did suffer from some mild alcohol withdrawal.     Acute diastolic heart failure (H)  Weight and edema have decreased significantly. Home therapy will be assisting with lymphedema wraps. These currently have been changed M,W,F. He will discharge on the current dose of lasix.   Wt Readings from Last 4 Encounters:   03/23/21 98 kg (216 lb)   03/18/21 100.7 kg (222 lb)   02/16/09 112.9 kg (249 lb)     Venous stasis dermatitis of both lower extremities  In-house wound doctor saw patient on  3/17. She debrided several areas of eschar and thick skin on both legs. After this there were open wounds. According to staff, his left leg is healing well, looks healthy. He still has a large open area on his right foot. He says this makes it very painful to plantar flex his foot. He is currently getting dressing changes M,W,F. The plan is for home care to work with him and his wife on managing this themselves. He has a cardiology appointment on Friday, so if home care cannot see him until Saturday, the dressing change could be postponed a day. The wound doctor had added lidocaine gel for dressing changes, he says this helps quite a bit. She also added Vit C and zinc.     Essential hypertension  Amlodipine was added for -210s/110-120s. For the past few days, BP has been 140-160s/60-80s. Will leave prn hydralazine in place for now. He sees cardiology this Friday. He does have a BP cuff at home and is advised to measure and record this at least daily    Physical deconditioning  Improving. He is safe to discharge and will continue work with therapy at home    Alcohol abuse  No plans for treatment at this time. He is not yet medically stable for inpatient treatment, but would recommend some type of outpatient support when able. Home health SW can assist with this as needed      Past Medical History:  has a past medical history of HTN.    Discharge Medications:    Current Outpatient Medications   Medication Sig Dispense Refill     acetaminophen (TYLENOL) 325 MG tablet Take 650 mg by mouth 4 times daily as needed       amLODIPine (NORVASC) 10 MG tablet Take 1 tablet (10 mg) by mouth daily       Ascorbic Acid (VITAMIN C) 500 MG CAPS Take 1 capsule by mouth 2 times daily       carvedilol (COREG) 25 MG tablet Take 25 mg by mouth 2 times daily       folic acid (FOLVITE) 1 MG tablet Take 1 mg by mouth daily       furosemide (LASIX) 40 MG tablet Take 40 mg by mouth daily       hydrALAZINE (APRESOLINE) 25 MG tablet Take 1  "tablet (25 mg) by mouth 2 times daily as needed (SBP > 170)       lidocaine (XYLOCAINE) 2 % external gel Apply topically as needed for moderate pain Apply to BLE and allow to sit x 10 minutes prior to wound care on M,W,F       lisinopril (ZESTRIL) 20 MG tablet Take 20 mg by mouth 2 times daily       melatonin 3 MG tablet Take 6 mg by mouth nightly as needed       oxyCODONE (ROXICODONE) 5 MG tablet Take 5 mg by mouth every 4 hours as needed       polyethylene glycol (MIRALAX) 17 GM/Dose powder 17 g by Nasogastric route daily       potassium chloride ER (KLOR-CON M) 20 MEQ CR tablet Take 20 mEq by mouth daily       Specialty Vitamins Products (VITAMINS FOR HAIR) TABS Take 1 tablet by mouth daily       thiamine 50 MG TABS Take 50 mg by mouth daily       zinc sulfate (ZINCATE) 220 (50 Zn) MG capsule Take 220 mg by mouth daily         Medication Changes/Rationale:     Amlodipine and hydralazine added for hypertension    Vitamin C and zinc added for wound healing    Controlled medications sent with patient:   remaining oxycodone to be sent     ROS:   10 point ROS of systems including Constitutional, Eyes, Respiratory, Cardiovascular, Gastroenterology, Genitourinary, Integumentary, Musculoskeletal, Psychiatric were all negative except for pertinent positives noted in my HPI.    Physical Exam:   Vitals: BP (!) 155/90   Pulse 65   Temp 97.8  F (36.6  C)   Resp 18   Ht 1.854 m (6' 1\")   Wt 98 kg (216 lb)   SpO2 97%   BMI 28.50 kg/m    BMI= Body mass index is 28.5 kg/m .   GENERAL APPEARANCE:  Alert, in no distress, oriented  ENT:  Mouth and posterior oropharynx normal, moist mucous membranes, normal hearing acuity  EYES:  EOM, conjunctivae, lids, pupils and irises normal  RESP:  respiratory effort and palpation of chest normal, lungs clear to auscultation , no respiratory distress  CV:  Palpation and auscultation of heart done , regular rate and rhythm, no murmur, rub, or gallop, legs wrapped, pedal edema appears to be " 3+, but lower legs now 1-2+  ABDOMEN:  normal bowel sounds, soft, nontender, no hepatosplenomegaly or other masses  SKIN: did not view legs due to lymphedema wraps/dressing changes M,W,F  PSYCH:  oriented X 3, affect and mood normal      SNF labs:   3/15/21  Na 139  K 4.0  BUN 12  Creat 0.9      DISCHARGE PLAN:    Follow up labs: No labs orders/due    Medical Follow Up:      Follow up with primary care provider in 1-2 weeks   Follow up with cardiology 3/26/21    Discharge Services: Home Care:  Occupational Therapy, Physical Therapy, Registered Nurse,  and From:  [facility]    Discharge Instructions Verbalized to Patient at Discharge:     Wound care M,W,F    Bilateral lower extremities:   1. Remove compression wraps and dressings   2. Apply lidocaine gel and allow to sit x 10 minutes   3. Gently wash with soap and water using wash cloth   4. Apply Xerofoam to open areas   5. Apply aquaphor to intact skin   6. Cover with non-adherent gauze   7. Therapy to replace lymphedema wraps         TOTAL DISCHARGE TIME:   Greater than 30 minutes  Electronically signed by:  PRATIBHA Echols [facility]  Phone: [phone]          Documentation of Face-to-Face and Certification for Home Health Services     Patient: Anjel Mullins   YOB: 1970  MR Number: [id number]  Today's Date: 3/23/2021    I certify that patient: Anjel Mullins is under my care and that I, or a nurse practitioner or physician's assistant working with me, had a face-to-face encounter that meets the physician face-to-face encounter requirements with this patient on: 3/23/2021.    This encounter with the patient was in whole, or in part, for the following medical condition, which is the primary reason for home health care: The primary encounter diagnosis was Acute diastolic heart failure (H). Diagnoses of Venous stasis dermatitis of both lower extremities, Essential hypertension, Physical  deconditioning, and Alcohol abuse were also pertinent to this visit..    I certify that, based on my findings, the following services are medically necessary home health services: Nursing, Occupational Therapy, Physical Therapy and Social Work.    My clinical findings support the need for the above services because: Nurse is needed: To assess HTN after changes in medications or other medical regimen. and To provide caregiver training to assist with: wound care.., Occupational Therapy Services are needed to assess and treat cognitive ability and address ADL safety due to impairment in activity tolerance. and Physical Therapy Services are needed to assess and treat the following functional impairments: deconditioning.    Further, I certify that my clinical findings support that this patient is homebound (i.e. absences from home require considerable and taxing effort and are for medical reasons or [employer] services or infrequently or of short duration when for other reasons) because: Requires assistance of another person or specialized equipment to access medical services because patient: Is unable to walk greater than 100 feet without rest...    Based on the above findings. I certify that this patient is confined to the home and needs intermittent skilled nursing care, physical therapy and/or speech therapy.  The patient is under my care, and I have initiated the establishment of the plan of care.  This patient will be followed by a physician who will periodically review the plan of care.  Physician/Provider to provide follow up care: No Ref-Primary, Physician    Responsible Medicare certified [employer] Physician: Dr.Dan Bryan MD, signing F2F and only signing for initial order. Please send all follow up questions and concerns or needed follow up signatures to the PCP    Date: 3/23/2021             Statement Selected